# Patient Record
Sex: FEMALE | Race: WHITE | NOT HISPANIC OR LATINO | Employment: STUDENT | URBAN - METROPOLITAN AREA
[De-identification: names, ages, dates, MRNs, and addresses within clinical notes are randomized per-mention and may not be internally consistent; named-entity substitution may affect disease eponyms.]

---
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
## 2022-01-31 DIAGNOSIS — N94.6 DYSMENORRHEA IN ADOLESCENT: ICD-10-CM

## 2022-01-31 RX ORDER — LEVONORGESTREL AND ETHINYL ESTRADIOL 0.15-0.03
1 KIT ORAL DAILY
Qty: 84 TABLET | Refills: 3 | Status: SHIPPED | OUTPATIENT
Start: 2022-01-31 | End: 2022-07-21 | Stop reason: HOSPADM

## 2022-07-21 RX ORDER — FLUTICASONE PROPIONATE 50 MCG
SPRAY, SUSPENSION (ML) NASAL
COMMUNITY
Start: 2022-05-28 | End: 2022-12-28

## 2022-07-21 RX ORDER — LEVONORGESTREL AND ETHINYL ESTRADIOL 0.15-0.03
KIT ORAL
COMMUNITY
End: 2022-07-22 | Stop reason: SDUPTHER

## 2022-07-22 ENCOUNTER — OFFICE VISIT (OUTPATIENT)
Dept: FAMILY MEDICINE CLINIC | Facility: CLINIC | Age: 22
End: 2022-07-22
Payer: COMMERCIAL

## 2022-07-22 VITALS
BODY MASS INDEX: 21.86 KG/M2 | OXYGEN SATURATION: 100 % | TEMPERATURE: 97.4 F | HEIGHT: 66 IN | HEART RATE: 65 BPM | WEIGHT: 136 LBS | SYSTOLIC BLOOD PRESSURE: 100 MMHG | RESPIRATION RATE: 12 BRPM | DIASTOLIC BLOOD PRESSURE: 60 MMHG

## 2022-07-22 DIAGNOSIS — L70.0 ACNE VULGARIS: Primary | ICD-10-CM

## 2022-07-22 DIAGNOSIS — Z30.41 ENCOUNTER FOR SURVEILLANCE OF CONTRACEPTIVE PILLS: ICD-10-CM

## 2022-07-22 PROBLEM — Z20.828 SARS-ASSOCIATED CORONAVIRUS EXPOSURE: Status: RESOLVED | Noted: 2020-06-25 | Resolved: 2022-07-22

## 2022-07-22 PROBLEM — K21.9 GERD WITHOUT ESOPHAGITIS: Status: RESOLVED | Noted: 2017-12-13 | Resolved: 2022-07-22

## 2022-07-22 PROBLEM — B97.21 SARS-ASSOCIATED CORONAVIRUS INFECTION: Status: RESOLVED | Noted: 2020-08-20 | Resolved: 2022-07-22

## 2022-07-22 PROBLEM — Z13.0 SCREENING FOR SICKLE-CELL DISEASE OR TRAIT: Status: RESOLVED | Noted: 2020-06-25 | Resolved: 2022-07-22

## 2022-07-22 PROCEDURE — 99213 OFFICE O/P EST LOW 20 MIN: CPT | Performed by: NURSE PRACTITIONER

## 2022-07-22 RX ORDER — SPIRONOLACTONE 25 MG/1
25 TABLET ORAL DAILY
Qty: 90 TABLET | Refills: 3 | Status: SHIPPED | OUTPATIENT
Start: 2022-07-22

## 2022-07-22 RX ORDER — LEVONORGESTREL AND ETHINYL ESTRADIOL 0.15-0.03
1 KIT ORAL DAILY
Qty: 84 TABLET | Refills: 3 | Status: SHIPPED | OUTPATIENT
Start: 2022-07-22

## 2022-07-22 NOTE — PROGRESS NOTES
Assessment/Plan:    1  Acne vulgaris  -     spironolactone (ALDACTONE) 25 mg tablet; Take 1 tablet (25 mg total) by mouth daily    2  Encounter for surveillance of contraceptive pills  -     levonorgestrel-ethinyl estradiol (NORDETTE) 0 15-30 MG-MCG per tablet; Take 1 tablet by mouth daily             Return for Annual physical     Subjective:      Patient ID: Ramon Sanchez is a 23 y o  female  Chief Complaint   Patient presents with    Medication Management       Zina Talamantes is 27year old female who presents to the office for a medication check  No new acute complaints today  Doing well overall  The following portions of the patient's history were reviewed and updated as appropriate: allergies, current medications, past family history, past medical history, past social history, past surgical history and problem list     Review of Systems   Constitutional: Negative for chills, fatigue and fever  Respiratory: Negative for cough, chest tightness and shortness of breath  Cardiovascular: Negative for chest pain, palpitations and leg swelling  Current Outpatient Medications   Medication Sig Dispense Refill    albuterol (ProAir HFA) 90 mcg/act inhaler Inhale 2 puffs every 6 (six) hours as needed for wheezing 1 Inhaler 1    levonorgestrel-ethinyl estradiol (NORDETTE) 0 15-30 MG-MCG per tablet Take 1 tablet by mouth daily 84 tablet 3    spironolactone (ALDACTONE) 25 mg tablet Take 1 tablet (25 mg total) by mouth daily 90 tablet 3    fluticasone (FLONASE) 50 mcg/act nasal spray  (Patient not taking: No sig reported)       No current facility-administered medications for this visit  Objective:    /60   Pulse 65   Temp (!) 97 4 °F (36 3 °C) (Temporal)   Resp 12   Ht 5' 6" (1 676 m)   Wt 61 7 kg (136 lb)   SpO2 100%   BMI 21 95 kg/m²        Physical Exam  Vitals reviewed  Constitutional:       Appearance: Normal appearance  HENT:      Head: Normocephalic and atraumatic  Cardiovascular:      Rate and Rhythm: Normal rate and regular rhythm  Heart sounds: Normal heart sounds  Pulmonary:      Effort: Pulmonary effort is normal       Breath sounds: Normal breath sounds  Neurological:      Mental Status: She is alert and oriented to person, place, and time     Psychiatric:         Mood and Affect: Mood normal                 SCOTT Rios

## 2022-12-12 ENCOUNTER — OFFICE VISIT (OUTPATIENT)
Dept: FAMILY MEDICINE CLINIC | Facility: CLINIC | Age: 22
End: 2022-12-12

## 2022-12-12 VITALS
HEIGHT: 66 IN | DIASTOLIC BLOOD PRESSURE: 66 MMHG | OXYGEN SATURATION: 98 % | WEIGHT: 134 LBS | SYSTOLIC BLOOD PRESSURE: 98 MMHG | TEMPERATURE: 98.2 F | HEART RATE: 60 BPM | BODY MASS INDEX: 21.53 KG/M2 | RESPIRATION RATE: 12 BRPM

## 2022-12-12 DIAGNOSIS — J40 BRONCHITIS: Primary | ICD-10-CM

## 2022-12-12 DIAGNOSIS — J98.01 BRONCHOSPASM: ICD-10-CM

## 2022-12-12 DIAGNOSIS — R05.1 ACUTE COUGH: ICD-10-CM

## 2022-12-12 RX ORDER — BENZONATATE 200 MG/1
200 CAPSULE ORAL 3 TIMES DAILY PRN
Qty: 20 CAPSULE | Refills: 0 | Status: SHIPPED | OUTPATIENT
Start: 2022-12-12

## 2022-12-12 RX ORDER — ALBUTEROL SULFATE 2.5 MG/3ML
2.5 SOLUTION RESPIRATORY (INHALATION) EVERY 6 HOURS PRN
Qty: 30 ML | Refills: 0 | Status: SHIPPED | OUTPATIENT
Start: 2022-12-12

## 2022-12-12 RX ORDER — PREDNISONE 20 MG/1
TABLET ORAL
Qty: 11 TABLET | Refills: 0 | Status: SHIPPED | OUTPATIENT
Start: 2022-12-12

## 2022-12-12 NOTE — PROGRESS NOTES
Assessment/Plan:    1  Bronchitis  -     predniSONE 20 mg tablet; Take 2 tablets PO daily x's 3 days, then take 1 tablet daily x's 3 days, then take 1/2 tablet daily x's 3 days  -     benzonatate (TESSALON) 200 MG capsule; Take 1 capsule (200 mg total) by mouth 3 (three) times a day as needed for cough    2  Acute cough  -     predniSONE 20 mg tablet; Take 2 tablets PO daily x's 3 days, then take 1 tablet daily x's 3 days, then take 1/2 tablet daily x's 3 days  -     benzonatate (TESSALON) 200 MG capsule; Take 1 capsule (200 mg total) by mouth 3 (three) times a day as needed for cough    3  Bronchospasm  -     albuterol (2 5 mg/3 mL) 0 083 % nebulizer solution; Take 3 mL (2 5 mg total) by nebulization every 6 (six) hours as needed for wheezing or shortness of breath            There are no Patient Instructions on file for this visit  Return if symptoms worsen or fail to improve  Subjective:      Patient ID: Fahad Thompson is a 23 y o  female  Chief Complaint   Patient presents with   • Cold Like Symptoms     Pt c/o a cough and chest congestion for the past five days  Cough  This is a new problem  The current episode started in the past 7 days (5 days)  The problem has been waxing and waning  The problem occurs every few minutes  The cough is non-productive  Associated symptoms include nasal congestion, shortness of breath and wheezing  Pertinent negatives include no chest pain, chills, ear congestion, fever, headaches, postnasal drip or rhinorrhea  Nothing aggravates the symptoms  She has tried nothing for the symptoms  Her past medical history is significant for bronchitis  The following portions of the patient's history were reviewed and updated as appropriate: allergies, current medications, past family history, past medical history, past social history, past surgical history and problem list     Review of Systems   Constitutional: Negative for chills and fever  HENT: Positive for congestion  Negative for postnasal drip, rhinorrhea, sinus pressure and sinus pain  Respiratory: Positive for cough, chest tightness, shortness of breath and wheezing  Cardiovascular: Negative for chest pain  Neurological: Negative for headaches  Current Outpatient Medications   Medication Sig Dispense Refill   • albuterol (2 5 mg/3 mL) 0 083 % nebulizer solution Take 3 mL (2 5 mg total) by nebulization every 6 (six) hours as needed for wheezing or shortness of breath 30 mL 0   • albuterol (ProAir HFA) 90 mcg/act inhaler Inhale 2 puffs every 6 (six) hours as needed for wheezing 1 Inhaler 1   • benzonatate (TESSALON) 200 MG capsule Take 1 capsule (200 mg total) by mouth 3 (three) times a day as needed for cough 20 capsule 0   • levonorgestrel-ethinyl estradiol (NORDETTE) 0 15-30 MG-MCG per tablet Take 1 tablet by mouth daily 84 tablet 3   • predniSONE 20 mg tablet Take 2 tablets PO daily x's 3 days, then take 1 tablet daily x's 3 days, then take 1/2 tablet daily x's 3 days 11 tablet 0   • spironolactone (ALDACTONE) 25 mg tablet Take 1 tablet (25 mg total) by mouth daily 90 tablet 3   • fluticasone (FLONASE) 50 mcg/act nasal spray  (Patient not taking: Reported on 7/22/2022)       No current facility-administered medications for this visit  Objective:    BP 98/66 (BP Location: Right arm, Patient Position: Sitting, Cuff Size: Adult)   Pulse 60   Temp 98 2 °F (36 8 °C) (Temporal)   Resp 12   Ht 5' 6" (1 676 m)   Wt 60 8 kg (134 lb)   LMP 11/20/2022 (Approximate)   SpO2 98%   BMI 21 63 kg/m²        Physical Exam  Vitals reviewed  Constitutional:       General: She is not in acute distress  Appearance: Normal appearance  She is well-developed  She is not diaphoretic  HENT:      Head: Normocephalic and atraumatic  Right Ear: Tympanic membrane, ear canal and external ear normal  No drainage, swelling or tenderness  No middle ear effusion        Left Ear: Tympanic membrane, ear canal and external ear normal  No drainage, swelling or tenderness  No middle ear effusion  Nose: Rhinorrhea present  No mucosal edema  Rhinorrhea is clear  Right Sinus: No maxillary sinus tenderness or frontal sinus tenderness  Left Sinus: No maxillary sinus tenderness or frontal sinus tenderness  Mouth/Throat:      Pharynx: Uvula midline  No oropharyngeal exudate or posterior oropharyngeal erythema  Tonsils: 1+ on the right  1+ on the left  Eyes:      General:         Right eye: No discharge  Left eye: No discharge  Conjunctiva/sclera: Conjunctivae normal    Neck:      Thyroid: No thyromegaly  Cardiovascular:      Rate and Rhythm: Normal rate and regular rhythm  Heart sounds: Normal heart sounds  Pulmonary:      Effort: Pulmonary effort is normal       Breath sounds: Wheezing present  No decreased breath sounds, rhonchi or rales  Comments: Scant expiratory wheezing noted throughout BL lung fields  Musculoskeletal:      Cervical back: Normal range of motion and neck supple  Lymphadenopathy:      Cervical: No cervical adenopathy  Skin:     General: Skin is warm and dry  Findings: No rash  Neurological:      Mental Status: She is alert and oriented to person, place, and time  Psychiatric:         Behavior: Behavior normal          Thought Content:  Thought content normal                 SCOTT Villarreal

## 2022-12-28 ENCOUNTER — OFFICE VISIT (OUTPATIENT)
Dept: FAMILY MEDICINE CLINIC | Facility: CLINIC | Age: 22
End: 2022-12-28

## 2022-12-28 VITALS
SYSTOLIC BLOOD PRESSURE: 118 MMHG | OXYGEN SATURATION: 99 % | TEMPERATURE: 98.3 F | BODY MASS INDEX: 21.69 KG/M2 | WEIGHT: 135 LBS | RESPIRATION RATE: 12 BRPM | DIASTOLIC BLOOD PRESSURE: 74 MMHG | HEIGHT: 66 IN | HEART RATE: 76 BPM

## 2022-12-28 DIAGNOSIS — Z30.41 ENCOUNTER FOR SURVEILLANCE OF CONTRACEPTIVE PILLS: ICD-10-CM

## 2022-12-28 DIAGNOSIS — J02.9 PHARYNGITIS, UNSPECIFIED ETIOLOGY: Primary | ICD-10-CM

## 2022-12-28 LAB — S PYO AG THROAT QL: NEGATIVE

## 2022-12-28 RX ORDER — AZITHROMYCIN 250 MG/1
TABLET, FILM COATED ORAL
Qty: 6 TABLET | Refills: 0 | Status: SHIPPED | OUTPATIENT
Start: 2022-12-28 | End: 2023-01-01

## 2022-12-28 RX ORDER — LEVONORGESTREL AND ETHINYL ESTRADIOL 0.15-0.03
1 KIT ORAL DAILY
Qty: 84 TABLET | Refills: 3 | Status: SHIPPED | OUTPATIENT
Start: 2022-12-28

## 2022-12-28 RX ORDER — PREDNISONE 20 MG/1
40 TABLET ORAL DAILY
Qty: 8 TABLET | Refills: 0 | Status: SHIPPED | OUTPATIENT
Start: 2022-12-28 | End: 2023-01-01

## 2022-12-28 NOTE — PROGRESS NOTES
Name: Cynthia Linda      : 2000      MRN: [id number]  Encounter Provider: Janet Rodriguez MD  Encounter Date: 2022   Encounter department: [address] [place] [address]  Pharyngitis, unspecified etiology  -     azithromycin (ZITHROMAX) 250 mg tablet; Take 2 tablets today then 1 tablet daily x 4 days WITH FOOD  -     predniSONE 20 mg tablet; Take 2 tablets (40 mg total) by mouth daily for 4 days  -     POCT rapid strepA    2  Encounter for surveillance of contraceptive pills  -     levonorgestrel-ethinyl estradiol (NORDETTE) 0 15-30 MG-MCG per tablet; Take 1 tablet by mouth daily         Subjective      Sore Throat   This is a new problem  The problem has been gradually worsening  There has been no fever  The pain is mild  Associated symptoms include congestion, ear pain, headaches, swollen glands and trouble swallowing  Pertinent negatives include no abdominal pain, coughing, diarrhea, drooling, ear discharge, hoarse voice, plugged ear sensation, neck pain, shortness of breath, stridor or vomiting  She has had no exposure to strep or mono  She has tried NSAIDs for the symptoms  The treatment provided mild relief  Review of Systems   Constitutional: Negative for fever  HENT: Positive for congestion, ear pain, sore throat and trouble swallowing  Negative for drooling, ear discharge and hoarse voice  Eyes: Negative for discharge  Respiratory: Negative for cough, chest tightness, shortness of breath and stridor  Cardiovascular: Negative for chest pain and palpitations  Gastrointestinal: Negative for abdominal pain, diarrhea, nausea and vomiting  Musculoskeletal: Negative for arthralgias, joint swelling and neck pain  Neurological: Positive for headaches  Negative for numbness  Psychiatric/Behavioral: The patient is not nervous/anxious          Current Outpatient Medications on File Prior to Visit   Medication Sig   • albuterol (2 5 mg/3 mL) 0 083 % nebulizer solution Take 3 mL (2 5 mg total) by nebulization every 6 (six) hours as needed for wheezing or shortness of breath   • albuterol (ProAir HFA) 90 mcg/act inhaler Inhale 2 puffs every 6 (six) hours as needed for wheezing   • benzonatate (TESSALON) 200 MG capsule Take 1 capsule (200 mg total) by mouth 3 (three) times a day as needed for cough   • spironolactone (ALDACTONE) 25 mg tablet Take 1 tablet (25 mg total) by mouth daily   • [DISCONTINUED] levonorgestrel-ethinyl estradiol (NORDETTE) 0 15-30 MG-MCG per tablet Take 1 tablet by mouth daily   • [DISCONTINUED] fluticasone (FLONASE) 50 mcg/act nasal spray  (Patient not taking: Reported on 7/22/2022)   • [DISCONTINUED] predniSONE 20 mg tablet Take 2 tablets PO daily x's 3 days, then take 1 tablet daily x's 3 days, then take 1/2 tablet daily x's 3 days (Patient not taking: Reported on 12/28/2022)       Objective     /74 (BP Location: Right arm, Patient Position: Sitting, Cuff Size: Large)   Pulse 76   Temp 98 3 °F (36 8 °C) (Temporal)   Resp 12   Ht 5' 6" (1 676 m)   Wt 61 2 kg (135 lb)   LMP 12/25/2022   SpO2 99%   BMI 21 79 kg/m²     Physical Exam  Constitutional:       Appearance: She is well-developed  HENT:      Head: Normocephalic and atraumatic  Right Ear: Ear canal and external ear normal  A middle ear effusion is present  Left Ear: Ear canal and external ear normal  A middle ear effusion is present  Nose: Mucosal edema and rhinorrhea present  Mouth/Throat:      Pharynx: Uvula midline  Posterior oropharyngeal erythema present  No oropharyngeal exudate  Eyes:      General:         Right eye: No discharge  Left eye: No discharge  Pupils: Pupils are equal, round, and reactive to light  Cardiovascular:      Rate and Rhythm: Normal rate and regular rhythm  Heart sounds: Normal heart sounds  No murmur heard  Pulmonary:      Effort: Pulmonary effort is normal  No respiratory distress        Breath sounds: No wheezing or rales  Comments: PROLONGED EXP PHASE    Abdominal:      General: Bowel sounds are normal       Palpations: Abdomen is soft  Tenderness: There is no abdominal tenderness  There is no rebound  Musculoskeletal:      Cervical back: Normal range of motion and neck supple  Lymphadenopathy:      Cervical: No cervical adenopathy  Skin:     General: Skin is warm and dry  Findings: No rash  Neurological:      Mental Status: She is alert and oriented to person, place, and time  Psychiatric:         Behavior: Behavior normal          Thought Content:  Thought content normal          Judgment: Judgment normal        Cristiana Weber MD

## 2023-01-19 ENCOUNTER — OFFICE VISIT (OUTPATIENT)
Dept: FAMILY MEDICINE CLINIC | Facility: CLINIC | Age: 23
End: 2023-01-19

## 2023-01-19 VITALS
DIASTOLIC BLOOD PRESSURE: 70 MMHG | HEART RATE: 68 BPM | HEIGHT: 66 IN | SYSTOLIC BLOOD PRESSURE: 108 MMHG | RESPIRATION RATE: 12 BRPM | OXYGEN SATURATION: 98 % | WEIGHT: 136 LBS | BODY MASS INDEX: 21.86 KG/M2 | TEMPERATURE: 98.6 F

## 2023-01-19 DIAGNOSIS — K62.5 BRBPR (BRIGHT RED BLOOD PER RECTUM): Primary | ICD-10-CM

## 2023-01-19 DIAGNOSIS — K64.4 EXTERNAL HEMORRHOID: ICD-10-CM

## 2023-01-19 DIAGNOSIS — L70.0 ACNE VULGARIS: ICD-10-CM

## 2023-01-19 RX ORDER — SPIRONOLACTONE 25 MG/1
25 TABLET ORAL DAILY
Qty: 90 TABLET | Refills: 3 | Status: SHIPPED | OUTPATIENT
Start: 2023-01-19

## 2023-01-19 RX ORDER — HYDROCORTISONE 25 MG/G
CREAM TOPICAL 2 TIMES DAILY
Qty: 28 G | Refills: 0 | Status: SHIPPED | OUTPATIENT
Start: 2023-01-19

## 2023-01-19 NOTE — PROGRESS NOTES
Assessment/Plan:    1  BRBPR (bright red blood per rectum)  Assessment & Plan:  RTO if no relief in symptoms  Encourage high fiber diet, increase PO hydration  Metamucil in the AM  GI consult if no resolution in 10-14 days  2  External hemorrhoid  -     hydrocortisone (ANUSOL-HC) 2 5 % rectal cream; Apply topically 2 (two) times a day    3  Acne vulgaris  -     spironolactone (ALDACTONE) 25 mg tablet; Take 1 tablet (25 mg total) by mouth daily          Patient Instructions   Metamucil in the mornings with a full glass of water           Return in about 4 weeks (around 2/16/2023), or if symptoms worsen or fail to improve  Subjective:      Patient ID: Tono Rodriguez is a 23 y o  female  Chief Complaint   Patient presents with   • Hemorrhoids     Pt c/o hemorrhoids for the past ten days  April Avila is a 23year old female who presents to the office for evaluation and management of BRBPR  Reports that her symptoms occur with wiping after bowel movements  States she feels as if she does have a hemorrhoid  Denies significant constipation  The following portions of the patient's history were reviewed and updated as appropriate: allergies, current medications, past family history, past medical history, past social history, past surgical history and problem list     Review of Systems   Constitutional: Negative for chills, fatigue and fever  Respiratory: Negative for cough, chest tightness and shortness of breath  Cardiovascular: Negative for chest pain  Gastrointestinal: Positive for anal bleeding  Negative for abdominal distention, abdominal pain, blood in stool, constipation and diarrhea     Skin:        acne         Current Outpatient Medications   Medication Sig Dispense Refill   • albuterol (2 5 mg/3 mL) 0 083 % nebulizer solution Take 3 mL (2 5 mg total) by nebulization every 6 (six) hours as needed for wheezing or shortness of breath 30 mL 0   • albuterol (ProAir HFA) 90 mcg/act inhaler Inhale 2 puffs every 6 (six) hours as needed for wheezing 1 Inhaler 1   • hydrocortisone (ANUSOL-HC) 2 5 % rectal cream Apply topically 2 (two) times a day 28 g 0   • levonorgestrel-ethinyl estradiol (NORDETTE) 0 15-30 MG-MCG per tablet Take 1 tablet by mouth daily 84 tablet 3   • spironolactone (ALDACTONE) 25 mg tablet Take 1 tablet (25 mg total) by mouth daily 90 tablet 3     No current facility-administered medications for this visit  Objective:    /70 (BP Location: Right arm, Patient Position: Sitting, Cuff Size: Adult)   Pulse 68   Temp 98 6 °F (37 °C) (Temporal)   Resp 12   Ht 5' 6" (1 676 m)   Wt 61 7 kg (136 lb)   LMP 12/25/2022 (Exact Date)   SpO2 98%   BMI 21 95 kg/m²        Physical Exam  Vitals reviewed  Constitutional:       General: She is not in acute distress  Appearance: She is well-developed  She is not diaphoretic  HENT:      Head: Normocephalic and atraumatic  Eyes:      General:         Right eye: No discharge  Left eye: No discharge  Conjunctiva/sclera: Conjunctivae normal    Cardiovascular:      Rate and Rhythm: Normal rate and regular rhythm  Heart sounds: Normal heart sounds  Pulmonary:      Effort: Pulmonary effort is normal  No respiratory distress  Breath sounds: Normal breath sounds  No decreased breath sounds, wheezing, rhonchi or rales  Genitourinary:     Vagina: Normal       Rectum: External hemorrhoid present  No tenderness  Skin:     General: Skin is warm and dry  Findings: No rash  Neurological:      Mental Status: She is alert and oriented to person, place, and time  Psychiatric:         Behavior: Behavior normal          Thought Content:  Thought content normal          Judgment: Judgment normal               SCOTT Martin

## 2023-01-23 PROBLEM — K62.5 BRBPR (BRIGHT RED BLOOD PER RECTUM): Status: ACTIVE | Noted: 2023-01-23

## 2023-01-23 NOTE — ASSESSMENT & PLAN NOTE
RTO if no relief in symptoms  Encourage high fiber diet, increase PO hydration  Metamucil in the AM  GI consult if no resolution in 10-14 days

## 2023-06-07 DIAGNOSIS — L70.0 ACNE VULGARIS: ICD-10-CM

## 2023-06-07 RX ORDER — SPIRONOLACTONE 25 MG/1
25 TABLET ORAL DAILY
Qty: 90 TABLET | Refills: 0 | Status: SHIPPED | OUTPATIENT
Start: 2023-06-07

## 2023-06-07 NOTE — TELEPHONE ENCOUNTER
Requested medication(s) are due for refill today: Yes  Patient has already received a courtesy refill: No  Other reason request has been forwarded to provider: Pt is due for a CMP for this refill, is there any other labs you want to order?

## 2023-08-24 DIAGNOSIS — Z30.41 ENCOUNTER FOR SURVEILLANCE OF CONTRACEPTIVE PILLS: ICD-10-CM

## 2023-08-24 DIAGNOSIS — L70.0 ACNE VULGARIS: ICD-10-CM

## 2023-08-24 DIAGNOSIS — Z13.6 SCREENING FOR HYPERTENSION: Primary | ICD-10-CM

## 2023-08-24 RX ORDER — LEVONORGESTREL AND ETHINYL ESTRADIOL 0.15-0.03
1 KIT ORAL DAILY
Qty: 84 TABLET | Refills: 3 | Status: SHIPPED | OUTPATIENT
Start: 2023-08-24

## 2023-08-24 NOTE — TELEPHONE ENCOUNTER
Requested medication(s) are due for refill today: No unknown  Patient has already received a courtesy refill: No  Other reason request has been forwarded to provider:  The requested medication is not on the active medication list.

## 2023-08-28 RX ORDER — SPIRONOLACTONE 25 MG/1
25 TABLET ORAL DAILY
Qty: 90 TABLET | Refills: 0 | Status: SHIPPED | OUTPATIENT
Start: 2023-08-28

## 2023-08-28 NOTE — TELEPHONE ENCOUNTER
Requested medication(s) are due for refill today: Yes  Patient has already received a courtesy refill: No  Other reason request has been forwarded to provider: Pt due for a BP ck, appt made for when she is back from school around Thanksgiving. Labs also due, ordered and pt will get them done soon.

## 2023-11-24 ENCOUNTER — OFFICE VISIT (OUTPATIENT)
Dept: FAMILY MEDICINE CLINIC | Facility: CLINIC | Age: 23
End: 2023-11-24
Payer: COMMERCIAL

## 2023-11-24 VITALS
WEIGHT: 128 LBS | DIASTOLIC BLOOD PRESSURE: 60 MMHG | HEART RATE: 54 BPM | HEIGHT: 66 IN | OXYGEN SATURATION: 100 % | TEMPERATURE: 96.9 F | SYSTOLIC BLOOD PRESSURE: 94 MMHG | BODY MASS INDEX: 20.57 KG/M2 | RESPIRATION RATE: 12 BRPM

## 2023-11-24 DIAGNOSIS — L70.0 ACNE VULGARIS: ICD-10-CM

## 2023-11-24 DIAGNOSIS — K90.49 FOOD INTOLERANCE: ICD-10-CM

## 2023-11-24 DIAGNOSIS — Z13.29 SCREENING FOR THYROID DISORDER: ICD-10-CM

## 2023-11-24 DIAGNOSIS — Z13.0 SCREENING FOR DEFICIENCY ANEMIA: ICD-10-CM

## 2023-11-24 DIAGNOSIS — Z11.59 NEED FOR HEPATITIS C SCREENING TEST: ICD-10-CM

## 2023-11-24 DIAGNOSIS — Z13.1 SCREENING FOR DIABETES MELLITUS: ICD-10-CM

## 2023-11-24 DIAGNOSIS — Z11.3 SCREENING FOR STDS (SEXUALLY TRANSMITTED DISEASES): ICD-10-CM

## 2023-11-24 DIAGNOSIS — Z13.220 SCREENING FOR LIPID DISORDERS: ICD-10-CM

## 2023-11-24 DIAGNOSIS — R14.3 EXCESSIVE GAS: Primary | ICD-10-CM

## 2023-11-24 PROBLEM — K62.5 BRBPR (BRIGHT RED BLOOD PER RECTUM): Status: RESOLVED | Noted: 2023-01-23 | Resolved: 2023-11-24

## 2023-11-24 PROBLEM — N94.6 DYSMENORRHEA IN ADOLESCENT: Status: RESOLVED | Noted: 2020-06-25 | Resolved: 2023-11-24

## 2023-11-24 PROCEDURE — 99214 OFFICE O/P EST MOD 30 MIN: CPT | Performed by: NURSE PRACTITIONER

## 2023-11-24 RX ORDER — SPIRONOLACTONE 25 MG/1
25 TABLET ORAL DAILY
Qty: 90 TABLET | Refills: 1 | Status: SHIPPED | OUTPATIENT
Start: 2023-11-24

## 2023-11-24 NOTE — PROGRESS NOTES
Assessment/Plan:    1. Excessive gas  -     Food Allergy Profile; Future  -     Sedimentation rate, automated; Future  -     Food Allergy Profile  -     Sedimentation rate, automated    2. Food intolerance  -     Food Allergy Profile; Future  -     Sedimentation rate, automated; Future  -     Food Allergy Profile  -     Sedimentation rate, automated    3. Acne vulgaris  -     spironolactone (ALDACTONE) 25 mg tablet; Take 1 tablet (25 mg total) by mouth daily    4. Screening for deficiency anemia  -     CBC and differential; Future  -     CBC and differential    5. Screening for diabetes mellitus  -     Comprehensive metabolic panel; Future  -     Comprehensive metabolic panel    6. Screening for thyroid disorder  -     TSH, 3rd generation; Future  -     TSH, 3rd generation    7. Screening for lipid disorders  -     Lipid panel; Future  -     Lipid panel    8. Screening for STDs (sexually transmitted diseases)  -     Chlamydia/GC amplified DNA by PCR; Future; Expected date: 11/24/2023    9. Need for hepatitis C screening test  -     Hepatitis C Ab W/Refl To HCV RNA, Qn, PCR ([facility]); Future  -     Hepatitis C Ab W/Refl To HCV RNA, Qn, PCR ([facility])        Depression Screening and Follow-up Plan: Patient was screened for depression during today's encounter. They screened negative with a PHQ-2 score of 0. Patient Instructions   Pepcid in the mornings   Take a probiotic   Keep food diary  Complete lab work for food allergy        Return in about 4 weeks (around 12/22/2023) for Recheck. Subjective:      Patient ID: Pilar Sharma is a 26 y.o. female. Chief Complaint   Patient presents with    Blood Pressure Check       Socorro Lei is a 26year old female who presents to the office for a med check. Pt reports she has been taking spironolactone for acne and reports that thsi has been effective for management. Additionally, she reports that she has been having stomach upset.  Reports malodorous gas with eating certain foods such as dairy and gluten. Reports abdominal bloating. Reports that she has intermittent constipation mixed with normal stools. Denies nausea or vomiting. The following portions of the patient's history were reviewed and updated as appropriate: allergies, current medications, past family history, past medical history, past social history, past surgical history and problem list.    Review of Systems   Constitutional:  Negative for chills and fever. Gastrointestinal:  Positive for abdominal distention (chronic, intermittent) and constipation (chronic, intermittent). Negative for abdominal pain, diarrhea, nausea and vomiting. Current Outpatient Medications   Medication Sig Dispense Refill    albuterol (2.5 mg/3 mL) 0.083 % nebulizer solution Take 3 mL (2.5 mg total) by nebulization every 6 (six) hours as needed for wheezing or shortness of breath 30 mL 0    Kurvelo 0.15-30 MG-MCG per tablet TAKE 1 TABLET BY MOUTH EVERY DAY 84 tablet 3    spironolactone (ALDACTONE) 25 mg tablet Take 1 tablet (25 mg total) by mouth daily 90 tablet 1     No current facility-administered medications for this visit. Objective:    BP 94/60 (BP Location: Left arm, Patient Position: Sitting, Cuff Size: Standard)   Pulse (!) 54   Temp (!) 96.9 °F (36.1 °C) (Temporal)   Resp 12   Ht 5' 6" (1.676 m)   Wt 58.1 kg (128 lb)   LMP 11/12/2023   SpO2 100%   BMI 20.66 kg/m²        Physical Exam  Vitals reviewed. Constitutional:       Appearance: Normal appearance. HENT:      Head: Normocephalic and atraumatic. Cardiovascular:      Rate and Rhythm: Normal rate and regular rhythm. Heart sounds: Normal heart sounds. Pulmonary:      Effort: Pulmonary effort is normal.      Breath sounds: Normal breath sounds. Neurological:      Mental Status: She is alert and oriented to person, place, and time.    Psychiatric:         Mood and Affect: Mood normal.                SCOTT Velasquez

## 2023-11-28 LAB — HCV AB SERPL QL IA: NORMAL

## 2023-11-29 LAB
ALBUMIN SERPL-MCNC: 4.7 G/DL (ref 3.6–5.1)
ALBUMIN/GLOB SERPL: 1.6 (CALC) (ref 1–2.5)
ALMOND IGE QN: <0.1 KU/L
ALP SERPL-CCNC: 51 U/L (ref 31–125)
ALT SERPL-CCNC: 9 U/L (ref 6–29)
AST SERPL-CCNC: 28 U/L (ref 10–30)
BASOPHILS # BLD AUTO: 50 CELLS/UL (ref 0–200)
BASOPHILS NFR BLD AUTO: 0.9 %
BILIRUB SERPL-MCNC: 0.7 MG/DL (ref 0.2–1.2)
BUN SERPL-MCNC: 13 MG/DL (ref 7–25)
BUN/CREAT SERPL: 12 (CALC) (ref 6–22)
CALCIUM SERPL-MCNC: 9.8 MG/DL (ref 8.6–10.2)
CASHEW NUT IGE QN: <0.1 KU/L
CHLORIDE SERPL-SCNC: 100 MMOL/L (ref 98–110)
CHOLEST SERPL-MCNC: 242 MG/DL
CHOLEST/HDLC SERPL: 1.8 (CALC)
CO2 SERPL-SCNC: 26 MMOL/L (ref 20–32)
CODFISH IGE QN: <0.1 KU/L
CREAT SERPL-MCNC: 1.06 MG/DL (ref 0.5–0.96)
DEPRECATED ALMOND IGE RAST QL: 0
DEPRECATED CASHEW NUT IGE RAST QL: 0
DEPRECATED CODFISH IGE RAST QL: 0
DEPRECATED EGG WHITE IGE RAST QL: 0
DEPRECATED HAZELNUT IGE RAST QL: 0
DEPRECATED MILK IGE RAST QL: ABNORMAL
DEPRECATED PEANUT IGE RAST QL: 0
DEPRECATED SALMON IGE RAST QL: 0
DEPRECATED SCALLOP IGE RAST QL: 0
DEPRECATED SESAME SEED IGE RAST QL: 0
DEPRECATED SHRIMP IGE RAST QL: 0
DEPRECATED SOYBEAN IGE RAST QL: 0
DEPRECATED TUNA IGE RAST QL: 0
DEPRECATED WALNUT IGE RAST QL: 0
DEPRECATED WHEAT IGE RAST QL: 0
EGG WHITE IGE QN: <0.1 KU/L
EOSINOPHIL # BLD AUTO: 50 CELLS/UL (ref 15–500)
EOSINOPHIL NFR BLD AUTO: 0.9 %
ERYTHROCYTE [DISTWIDTH] IN BLOOD BY AUTOMATED COUNT: 12.1 % (ref 11–15)
ERYTHROCYTE [SEDIMENTATION RATE] IN BLOOD BY WESTERGREN METHOD: 6 MM/H
GFR/BSA.PRED SERPLBLD CYS-BASED-ARV: 77 ML/MIN/1.73M2
GLOBULIN SER CALC-MCNC: 2.9 G/DL (CALC) (ref 1.9–3.7)
GLUCOSE SERPL-MCNC: 84 MG/DL (ref 65–99)
HAZELNUT IGE QN: <0.1 KU/L
HCT VFR BLD AUTO: 40.2 % (ref 35–45)
HDLC SERPL-MCNC: 135 MG/DL
HGB BLD-MCNC: 13.2 G/DL (ref 11.7–15.5)
LDLC SERPL CALC-MCNC: 92 MG/DL (CALC)
LYMPHOCYTES # BLD AUTO: 2360 CELLS/UL (ref 850–3900)
LYMPHOCYTES NFR BLD AUTO: 42.9 %
MCH RBC QN AUTO: 30.4 PG (ref 27–33)
MCHC RBC AUTO-ENTMCNC: 32.8 G/DL (ref 32–36)
MCV RBC AUTO: 92.6 FL (ref 80–100)
MILK IGE QN: 0.16 KU/L
MONOCYTES # BLD AUTO: 281 CELLS/UL (ref 200–950)
MONOCYTES NFR BLD AUTO: 5.1 %
NEUTROPHILS # BLD AUTO: 2761 CELLS/UL (ref 1500–7800)
NEUTROPHILS NFR BLD AUTO: 50.2 %
NONHDLC SERPL-MCNC: 107 MG/DL (CALC)
PEANUT IGE QN: <0.1 KU/L
PLATELET # BLD AUTO: 327 THOUSAND/UL (ref 140–400)
PMV BLD REES-ECKER: 11.2 FL (ref 7.5–12.5)
POTASSIUM SERPL-SCNC: 4.3 MMOL/L (ref 3.5–5.3)
PROT SERPL-MCNC: 7.6 G/DL (ref 6.1–8.1)
RBC # BLD AUTO: 4.34 MILLION/UL (ref 3.8–5.1)
SALMON IGE QN: <0.1 KU/L
SCALLOP IGE QN: <0.1 KU/L
SESAME SEED IGE QN: <0.1 KU/L
SHRIMP IGE QN: <0.1 KU/L
SODIUM SERPL-SCNC: 137 MMOL/L (ref 135–146)
SOYBEAN IGE QN: <0.1 KU/L
TRIGL SERPL-MCNC: 65 MG/DL
TSH SERPL-ACNC: 1.21 MIU/L
TUNA IGE QN: <0.1 KU/L
WALNUT IGE QN: <0.1 KU/L
WBC # BLD AUTO: 5.5 THOUSAND/UL (ref 3.8–10.8)
WHEAT IGE QN: <0.1 KU/L

## 2023-12-22 ENCOUNTER — OFFICE VISIT (OUTPATIENT)
Dept: FAMILY MEDICINE CLINIC | Facility: CLINIC | Age: 23
End: 2023-12-22
Payer: COMMERCIAL

## 2023-12-22 VITALS
BODY MASS INDEX: 20.18 KG/M2 | TEMPERATURE: 97.1 F | DIASTOLIC BLOOD PRESSURE: 60 MMHG | SYSTOLIC BLOOD PRESSURE: 98 MMHG | HEART RATE: 64 BPM | OXYGEN SATURATION: 98 % | WEIGHT: 125 LBS

## 2023-12-22 DIAGNOSIS — R14.3 EXCESSIVE GAS: ICD-10-CM

## 2023-12-22 DIAGNOSIS — L70.0 ACNE VULGARIS: ICD-10-CM

## 2023-12-22 DIAGNOSIS — Z23 NEED FOR TDAP VACCINATION: ICD-10-CM

## 2023-12-22 DIAGNOSIS — Z91.011 COW'S MILK ALLERGY: Primary | ICD-10-CM

## 2023-12-22 DIAGNOSIS — K90.49 FOOD INTOLERANCE: ICD-10-CM

## 2023-12-22 PROCEDURE — 90471 IMMUNIZATION ADMIN: CPT

## 2023-12-22 PROCEDURE — 90715 TDAP VACCINE 7 YRS/> IM: CPT

## 2023-12-22 PROCEDURE — 99213 OFFICE O/P EST LOW 20 MIN: CPT | Performed by: NURSE PRACTITIONER

## 2023-12-22 NOTE — ASSESSMENT & PLAN NOTE
Pt is taking aldactone and is interested in stopping this medication. Advise that she can DC this medication and monitor for symptoms.

## 2023-12-22 NOTE — PROGRESS NOTES
Assessment/Plan:    1. Cow's milk allergy  Assessment & Plan:  Advise pt to avoid cow milk products.      2. Excessive gas    3. Food intolerance  Assessment & Plan:  If symptoms persist or worsen follow up with GI.       4. Acne vulgaris  Assessment & Plan:  Pt is taking aldactone and is interested in stopping this medication. Advise that she can DC this medication and monitor for symptoms.       5. Need for Tdap vaccination  -     TDAP VACCINE GREATER THAN OR EQUAL TO 8YO IM              Return for Annual physical.    Subjective:      Patient ID: Antonietta Manzano is a 23 y.o. female.    Chief Complaint   Patient presents with    Follow-up     Review labs       Antonietta is a 23 year old female who presents to the office for a recheck and discussion of labs. Pt reports that she had been having excessive gas and abdominal blaoting associated with meals. Reports that she did see her resuts on Mychart and has avoided dairy products.         The following portions of the patient's history were reviewed and updated as appropriate: allergies, current medications, past family history, past medical history, past social history, past surgical history and problem list.    Review of Systems   Constitutional:  Negative for chills, fatigue and fever.   Respiratory:  Negative for cough, chest tightness and shortness of breath.    Cardiovascular:  Negative for chest pain.         Current Outpatient Medications   Medication Sig Dispense Refill    albuterol (2.5 mg/3 mL) 0.083 % nebulizer solution Take 3 mL (2.5 mg total) by nebulization every 6 (six) hours as needed for wheezing or shortness of breath 30 mL 0    Kurvelo 0.15-30 MG-MCG per tablet TAKE 1 TABLET BY MOUTH EVERY DAY 84 tablet 3    spironolactone (ALDACTONE) 25 mg tablet Take 1 tablet (25 mg total) by mouth daily 90 tablet 1     No current facility-administered medications for this visit.       Objective:    BP 98/60 (BP Location: Left arm, Patient Position: Sitting, Cuff  Size: Large)   Pulse 64   Temp (!) 97.1 °F (36.2 °C) (Temporal)   Wt 56.7 kg (125 lb)   LMP 11/12/2023   SpO2 98%   BMI 20.18 kg/m²        Physical Exam  Vitals reviewed.   Constitutional:       Appearance: Normal appearance.   HENT:      Head: Normocephalic and atraumatic.   Cardiovascular:      Rate and Rhythm: Normal rate and regular rhythm.      Heart sounds: Normal heart sounds.   Pulmonary:      Effort: Pulmonary effort is normal.      Breath sounds: Normal breath sounds.   Neurological:      Mental Status: She is alert and oriented to person, place, and time.   Psychiatric:         Mood and Affect: Mood normal.                SCOTT Reed

## 2024-01-01 DIAGNOSIS — L70.0 ACNE VULGARIS: ICD-10-CM

## 2024-01-02 RX ORDER — SPIRONOLACTONE 25 MG/1
25 TABLET ORAL DAILY
Qty: 90 TABLET | Refills: 1 | Status: SHIPPED | OUTPATIENT
Start: 2024-01-02

## 2024-02-09 DIAGNOSIS — Z30.41 ENCOUNTER FOR SURVEILLANCE OF CONTRACEPTIVE PILLS: ICD-10-CM

## 2024-02-09 RX ORDER — LEVONORGESTREL AND ETHINYL ESTRADIOL 0.15-0.03
1 KIT ORAL DAILY
Qty: 84 TABLET | Refills: 1 | Status: SHIPPED | OUTPATIENT
Start: 2024-02-09

## 2024-04-23 DIAGNOSIS — L70.0 ACNE VULGARIS: ICD-10-CM

## 2024-04-23 RX ORDER — SPIRONOLACTONE 25 MG/1
25 TABLET ORAL DAILY
Qty: 90 TABLET | Refills: 1 | Status: SHIPPED | OUTPATIENT
Start: 2024-04-23

## 2024-04-23 NOTE — TELEPHONE ENCOUNTER
Reason for call: NOT A DUPLICATE patient is at [facility] and her medication went to the incorrect pharmacy. She is currently out of medication    [x] Refill   [] Prior Auth  [] Other:     Office:   [x] PCP/Provider - Dr Houser  [] Specialty/Provider -     Medication: spironolactone    Dose/Frequency: 25 mg daily    Quantity: 90D w refill    Pharmacy: [facility] on file     Does the patient have enough for 3 days?   [] Yes   [x] No - Send as HP to POD

## 2024-05-28 ENCOUNTER — OFFICE VISIT (OUTPATIENT)
Dept: FAMILY MEDICINE CLINIC | Facility: CLINIC | Age: 24
End: 2024-05-28
Payer: COMMERCIAL

## 2024-05-28 VITALS
TEMPERATURE: 97.7 F | WEIGHT: 120.2 LBS | HEIGHT: 66 IN | BODY MASS INDEX: 19.32 KG/M2 | HEART RATE: 78 BPM | SYSTOLIC BLOOD PRESSURE: 100 MMHG | OXYGEN SATURATION: 99 % | RESPIRATION RATE: 16 BRPM | DIASTOLIC BLOOD PRESSURE: 60 MMHG

## 2024-05-28 DIAGNOSIS — N92.6 MISSED PERIODS: ICD-10-CM

## 2024-05-28 DIAGNOSIS — J04.0 LARYNGITIS: Primary | ICD-10-CM

## 2024-05-28 DIAGNOSIS — Z30.41 ENCOUNTER FOR SURVEILLANCE OF CONTRACEPTIVE PILLS: ICD-10-CM

## 2024-05-28 LAB — SL AMB POCT URINE HCG: NEGATIVE

## 2024-05-28 PROCEDURE — 99213 OFFICE O/P EST LOW 20 MIN: CPT | Performed by: NURSE PRACTITIONER

## 2024-05-28 PROCEDURE — 81025 URINE PREGNANCY TEST: CPT | Performed by: NURSE PRACTITIONER

## 2024-05-28 RX ORDER — LEVONORGESTREL AND ETHINYL ESTRADIOL 0.15-0.03
1 KIT ORAL DAILY
Qty: 84 TABLET | Refills: 3 | Status: SHIPPED | OUTPATIENT
Start: 2024-05-28

## 2024-05-28 NOTE — ASSESSMENT & PLAN NOTE
Pt reports missed last 2 periods. Recent weight loss, increase exercise and decreased oral intake. Advise nutrition and hydration throughout the day. Restart contraception, monitor for return of cycles.

## 2024-05-28 NOTE — PROGRESS NOTES
Assessment/Plan:    1. Laryngitis  Comments:  Recommend supportive care with fluids, voice rest and humidification (hot showers, humidifier). RTO if no symptom improvement in 4-5 days.    2. Encounter for surveillance of contraceptive pills  Assessment & Plan:  Pt reports missed last 2 periods. Recent weight loss, increase exercise and decreased oral intake. Advise nutrition and hydration throughout the day. Restart contraception, monitor for return of cycles.   Orders:  -     levonorgestrel-ethinyl estradiol (Kurvelo) 0.15-30 MG-MCG per tablet; Take 1 tablet by mouth daily  -     POCT urine HCG    3. Missed periods  -     POCT urine HCG            Return if symptoms worsen or fail to improve.    Subjective:      Patient ID: Antonietta Manzano is a 24 y.o. female.    Chief Complaint   Patient presents with    Laryngitis    Nasal Congestion     Green x 6 days, William/RADHAN    post nasal drip       Cough  This is a new problem. The current episode started in the past 7 days. The problem has been unchanged. The problem occurs hourly. The cough is Productive of sputum. Associated symptoms include headaches, nasal congestion, postnasal drip, rhinorrhea and a sore throat. Pertinent negatives include no chest pain, chills, ear congestion, ear pain, fever, heartburn, hemoptysis, myalgias, rash, shortness of breath, sweats, weight loss or wheezing. Nothing aggravates the symptoms.       The following portions of the patient's history were reviewed and updated as appropriate: allergies, current medications, past family history, past medical history, past social history, past surgical history and problem list.    Review of Systems   Constitutional:  Negative for chills, fever and weight loss.   HENT:  Positive for postnasal drip, rhinorrhea, sore throat and voice change (hoarse voice, lost voice). Negative for ear pain.    Respiratory:  Positive for cough (now resolved). Negative for hemoptysis, shortness of breath and wheezing.   "  Cardiovascular:  Negative for chest pain.   Gastrointestinal:  Negative for heartburn.   Genitourinary:  Positive for menstrual problem (missed last 2 periods).   Musculoskeletal:  Negative for myalgias.   Skin:  Negative for rash.   Neurological:  Positive for headaches.         Current Outpatient Medications   Medication Sig Dispense Refill    levonorgestrel-ethinyl estradiol (Kurvelo) 0.15-30 MG-MCG per tablet Take 1 tablet by mouth daily 84 tablet 3    spironolactone (ALDACTONE) 25 mg tablet Take 1 tablet (25 mg total) by mouth daily 90 tablet 1    albuterol (2.5 mg/3 mL) 0.083 % nebulizer solution Take 3 mL (2.5 mg total) by nebulization every 6 (six) hours as needed for wheezing or shortness of breath (Patient not taking: Reported on 5/28/2024) 30 mL 0     No current facility-administered medications for this visit.       Objective:    /60   Pulse 78   Temp 97.7 °F (36.5 °C) (Temporal)   Resp 16   Ht 5' 6\" (1.676 m)   Wt 54.5 kg (120 lb 3.2 oz)   LMP 03/24/2024 (Approximate)   SpO2 99%   BMI 19.40 kg/m²        Physical Exam  Vitals reviewed.   Constitutional:       General: She is not in acute distress.     Appearance: Normal appearance. She is well-developed. She is not diaphoretic.   HENT:      Head: Normocephalic and atraumatic.      Right Ear: Tympanic membrane, ear canal and external ear normal. No drainage, swelling or tenderness. No middle ear effusion.      Left Ear: Tympanic membrane, ear canal and external ear normal. No drainage, swelling or tenderness.  No middle ear effusion.      Nose: Rhinorrhea present. No sinus tenderness or mucosal edema. Rhinorrhea is clear.      Right Sinus: No maxillary sinus tenderness or frontal sinus tenderness.      Left Sinus: No maxillary sinus tenderness or frontal sinus tenderness.      Mouth/Throat:      Mouth: Mucous membranes are normal.      Pharynx: Oropharynx is clear. Uvula midline. No oropharyngeal exudate, posterior oropharyngeal edema or " posterior oropharyngeal erythema.      Comments: Hoarse voice noted during interview  Eyes:      General:         Right eye: No discharge.         Left eye: No discharge.      Conjunctiva/sclera: Conjunctivae normal.   Neck:      Thyroid: No thyromegaly.   Cardiovascular:      Rate and Rhythm: Normal rate and regular rhythm.      Heart sounds: Normal heart sounds.   Pulmonary:      Effort: Pulmonary effort is normal.      Breath sounds: Normal breath sounds. No decreased breath sounds, wheezing, rhonchi or rales.   Musculoskeletal:      Cervical back: Full passive range of motion without pain, normal range of motion and neck supple.   Lymphadenopathy:      Cervical: No cervical adenopathy.   Skin:     General: Skin is warm and dry.      Findings: No rash.   Neurological:      Mental Status: She is alert and oriented to person, place, and time.   Psychiatric:         Mood and Affect: Mood and affect normal.         Behavior: Behavior normal.         Thought Content: Thought content normal.                SCOTT Reed

## 2024-06-17 DIAGNOSIS — L70.0 ACNE VULGARIS: ICD-10-CM

## 2024-06-18 RX ORDER — SPIRONOLACTONE 25 MG/1
25 TABLET ORAL DAILY
Qty: 90 TABLET | Refills: 1 | Status: SHIPPED | OUTPATIENT
Start: 2024-06-18

## 2024-08-01 ENCOUNTER — PATIENT MESSAGE (OUTPATIENT)
Dept: FAMILY MEDICINE CLINIC | Facility: CLINIC | Age: 24
End: 2024-08-01

## 2024-08-01 DIAGNOSIS — K90.49 FOOD INTOLERANCE: Primary | ICD-10-CM

## 2024-10-16 ENCOUNTER — PATIENT MESSAGE (OUTPATIENT)
Dept: FAMILY MEDICINE CLINIC | Facility: CLINIC | Age: 24
End: 2024-10-16

## 2024-10-16 DIAGNOSIS — Z13.1 SCREENING FOR DIABETES MELLITUS: ICD-10-CM

## 2024-10-16 DIAGNOSIS — Z13.220 SCREENING FOR LIPID DISORDERS: ICD-10-CM

## 2024-10-16 DIAGNOSIS — Z13.0 SCREENING FOR DEFICIENCY ANEMIA: Primary | ICD-10-CM

## 2024-10-16 DIAGNOSIS — Z13.29 SCREENING FOR THYROID DISORDER: ICD-10-CM

## 2024-10-16 NOTE — PATIENT COMMUNICATION
Spoke to Antonietta and offered an appt with Dr. Randolph.  Johnna already put in the labs for [facility]    She has new insurance.  She is going to check with her mom to see what lab they are suppose to use.

## 2024-10-16 NOTE — PATIENT COMMUNICATION
Patient cannot do 10/23 or 10/25-  she is asking to be called if anything opens up on 10/17, 10/18, 10/21 or 10/22.  Please reach ouf if any cancels

## 2024-10-20 LAB
ALBUMIN SERPL-MCNC: 4.6 G/DL (ref 3.6–5.1)
ALBUMIN/GLOB SERPL: 1.5 (CALC) (ref 1–2.5)
ALP SERPL-CCNC: 38 U/L (ref 31–125)
ALT SERPL-CCNC: 10 U/L (ref 6–29)
AST SERPL-CCNC: 22 U/L (ref 10–30)
BASOPHILS # BLD AUTO: 42 CELLS/UL (ref 0–200)
BASOPHILS NFR BLD AUTO: 1 %
BILIRUB SERPL-MCNC: 0.5 MG/DL (ref 0.2–1.2)
BUN SERPL-MCNC: 18 MG/DL (ref 7–25)
BUN/CREAT SERPL: 18 (CALC) (ref 6–22)
CALCIUM SERPL-MCNC: 9.6 MG/DL (ref 8.6–10.2)
CHLORIDE SERPL-SCNC: 102 MMOL/L (ref 98–110)
CHOLEST SERPL-MCNC: 251 MG/DL
CHOLEST/HDLC SERPL: 1.8 (CALC)
CO2 SERPL-SCNC: 29 MMOL/L (ref 20–32)
CREAT SERPL-MCNC: 1.02 MG/DL (ref 0.5–0.96)
EOSINOPHIL # BLD AUTO: 42 CELLS/UL (ref 15–500)
EOSINOPHIL NFR BLD AUTO: 1 %
ERYTHROCYTE [DISTWIDTH] IN BLOOD BY AUTOMATED COUNT: 12.1 % (ref 11–15)
GFR/BSA.PRED SERPLBLD CYS-BASED-ARV: 80 ML/MIN/1.73M2
GLOBULIN SER CALC-MCNC: 3.1 G/DL (CALC) (ref 1.9–3.7)
GLUCOSE SERPL-MCNC: 81 MG/DL (ref 65–99)
HCT VFR BLD AUTO: 39.2 % (ref 35–45)
HDLC SERPL-MCNC: 139 MG/DL
HGB BLD-MCNC: 12.7 G/DL (ref 11.7–15.5)
LDLC SERPL CALC-MCNC: 96 MG/DL (CALC)
LYMPHOCYTES # BLD AUTO: 1709 CELLS/UL (ref 850–3900)
LYMPHOCYTES NFR BLD AUTO: 40.7 %
MCH RBC QN AUTO: 30.9 PG (ref 27–33)
MCHC RBC AUTO-ENTMCNC: 32.4 G/DL (ref 32–36)
MCV RBC AUTO: 95.4 FL (ref 80–100)
MONOCYTES # BLD AUTO: 323 CELLS/UL (ref 200–950)
MONOCYTES NFR BLD AUTO: 7.7 %
NEUTROPHILS # BLD AUTO: 2083 CELLS/UL (ref 1500–7800)
NEUTROPHILS NFR BLD AUTO: 49.6 %
NONHDLC SERPL-MCNC: 112 MG/DL (CALC)
PLATELET # BLD AUTO: 342 THOUSAND/UL (ref 140–400)
PMV BLD REES-ECKER: 10.4 FL (ref 7.5–12.5)
POTASSIUM SERPL-SCNC: 4.5 MMOL/L (ref 3.5–5.3)
PROT SERPL-MCNC: 7.7 G/DL (ref 6.1–8.1)
RBC # BLD AUTO: 4.11 MILLION/UL (ref 3.8–5.1)
SODIUM SERPL-SCNC: 138 MMOL/L (ref 135–146)
TRIGL SERPL-MCNC: 73 MG/DL
TSH SERPL-ACNC: 1.15 MIU/L
WBC # BLD AUTO: 4.2 THOUSAND/UL (ref 3.8–10.8)

## 2024-10-28 ENCOUNTER — OFFICE VISIT (OUTPATIENT)
Dept: FAMILY MEDICINE CLINIC | Facility: CLINIC | Age: 24
End: 2024-10-28
Payer: COMMERCIAL

## 2024-10-28 VITALS
BODY MASS INDEX: 20.03 KG/M2 | OXYGEN SATURATION: 99 % | TEMPERATURE: 97.1 F | SYSTOLIC BLOOD PRESSURE: 104 MMHG | WEIGHT: 124.6 LBS | DIASTOLIC BLOOD PRESSURE: 70 MMHG | HEART RATE: 47 BPM | RESPIRATION RATE: 16 BRPM | HEIGHT: 66 IN

## 2024-10-28 DIAGNOSIS — Z12.4 CERVICAL CANCER SCREENING: ICD-10-CM

## 2024-10-28 DIAGNOSIS — Z00.00 ANNUAL PHYSICAL EXAM: Primary | ICD-10-CM

## 2024-10-28 DIAGNOSIS — L70.0 ACNE VULGARIS: ICD-10-CM

## 2024-10-28 DIAGNOSIS — Z12.4 SCREENING FOR CERVICAL CANCER: ICD-10-CM

## 2024-10-28 DIAGNOSIS — N92.6 IRREGULAR PERIODS/MENSTRUAL CYCLES: ICD-10-CM

## 2024-10-28 PROCEDURE — 99395 PREV VISIT EST AGE 18-39: CPT | Performed by: STUDENT IN AN ORGANIZED HEALTH CARE EDUCATION/TRAINING PROGRAM

## 2024-10-28 PROCEDURE — 99213 OFFICE O/P EST LOW 20 MIN: CPT | Performed by: STUDENT IN AN ORGANIZED HEALTH CARE EDUCATION/TRAINING PROGRAM

## 2024-10-28 NOTE — PROGRESS NOTES
Adult Annual Physical  Name: Antonietta Manzano      : 2000      MRN: [id number]  Encounter Provider: Karina Randolph MD  Encounter Date: 10/28/2024   Encounter department: [facility]    Assessment & Plan  Annual physical exam         Irregular periods/menstrual cycles    Orders:    US pelvis complete w transvaginal; Future    Acne vulgaris    Orders:    US pelvis complete w transvaginal; Future    Screening for cervical cancer         Cervical cancer screening    Orders:    IGP,rfx Aptima HPV all pth    Immunizations and preventive care screenings were discussed with patient today. Appropriate education was printed on patient's after visit summary.    Counseling:  Dental Health: discussed importance of regular tooth brushing, flossing, and dental visits.  Sexual health: discussed sexually transmitted diseases, partner selection, use of condoms, avoidance of unintended pregnancy, and contraceptive alternatives.  Exercise: the importance of regular exercise/physical activity was discussed. Recommend exercise 3-5 times per week for at least 30 minutes.       Depression Screening and Follow-up Plan: Patient was screened for depression during today's encounter. They screened negative with a PHQ-2 score of 0.        History of Present Illness     Adult Annual Physical:  Patient presents for annual physical. Patient has irregular periods and notes her acne is worsening. She notes she is more fatigued and not sleeping well at night. .     Diet and Physical Activity:  - Diet/Nutrition: consuming 3-5 servings of fruits/vegetables daily, adequate fiber intake, heart healthy (low sodium) diet and well balanced diet.  - Exercise: vigorous cardiovascular exercise, strength training exercises and 5-7 times a week on average.    Depression Screening:  - PHQ-2 Score: 0    General Health:    - Vision: no vision problems.    /GYN Health:    - Menopause: premenopausal.   - History of STDs: no  -  "Contraception: oral contraceptives.      Review of Systems   Constitutional:  Negative for activity change, chills, diaphoresis, fatigue and fever.   HENT:  Negative for congestion, postnasal drip, rhinorrhea and sore throat.    Respiratory:  Negative for cough, shortness of breath and wheezing.    Cardiovascular:  Negative for chest pain, palpitations and leg swelling.   Gastrointestinal:  Negative for abdominal pain, constipation, diarrhea, nausea and vomiting.   Musculoskeletal:  Negative for myalgias.   Skin:  Negative for rash.   Neurological:  Negative for weakness, light-headedness and headaches.   Psychiatric/Behavioral:  The patient is not nervous/anxious.          Objective     /70   Pulse (!) 47   Temp (!) 97.1 °F (36.2 °C) (Temporal)   Resp 16   Ht 5' 6\" (1.676 m)   Wt 56.5 kg (124 lb 9.6 oz)   LMP 09/23/2024   SpO2 99%   BMI 20.11 kg/m²     Physical Exam  Vitals and nursing note reviewed. Exam conducted with a chaperone present.   Constitutional:       General: She is not in acute distress.     Appearance: She is well-developed.   HENT:      Head: Normocephalic and atraumatic.   Eyes:      Conjunctiva/sclera: Conjunctivae normal.   Cardiovascular:      Rate and Rhythm: Normal rate and regular rhythm.      Heart sounds: No murmur heard.  Pulmonary:      Effort: Pulmonary effort is normal. No respiratory distress.      Breath sounds: Normal breath sounds.   Abdominal:      Palpations: Abdomen is soft.      Tenderness: There is no abdominal tenderness.   Genitourinary:     Vagina: Normal.      Cervix: Discharge and cervical bleeding present.   Musculoskeletal:         General: No swelling.      Cervical back: Neck supple.   Skin:     General: Skin is warm and dry.      Capillary Refill: Capillary refill takes less than 2 seconds.   Neurological:      Mental Status: She is alert.   Psychiatric:         Mood and Affect: Mood normal.         "

## 2024-11-04 LAB
CYTOLOGIST CVX/VAG CYTO: NORMAL
DX ICD CODE: NORMAL
OTHER STN SPEC: NORMAL
OTHER STN SPEC: NORMAL
PATH REPORT.FINAL DX SPEC: NORMAL
SL AMB NOTE:: NORMAL
SL AMB SPECIMEN ADEQUACY: NORMAL
SL AMB TEST METHODOLOGY: NORMAL

## 2024-12-10 ENCOUNTER — TELEPHONE (OUTPATIENT)
Age: 24
End: 2024-12-10

## 2024-12-10 NOTE — TELEPHONE ENCOUNTER
Informed Antonietta of Johnna's response.  Forwarded this message to Dr Randolph for her review.

## 2024-12-10 NOTE — TELEPHONE ENCOUNTER
This US was ordered by Dr. Randolph. Her US looks OK, no evidence to suggest ovarian or uterine cause for irregular menses as documented at her visit. But she should discuss her results further with Dr. Randolph and correlate with presenting symptoms.

## 2024-12-10 NOTE — TELEPHONE ENCOUNTER
Patient called in to discuss US pelvis results that was completed 11/21 to discuss next steps. External results on chart. Patient also wanted PCP to be aware that she has continued stomach/GI issues with constipation, bloating. Please follow up with patient for results and ongoing GI concerns.

## 2025-01-07 ENCOUNTER — OFFICE VISIT (OUTPATIENT)
Dept: FAMILY MEDICINE CLINIC | Facility: CLINIC | Age: 25
End: 2025-01-07
Payer: COMMERCIAL

## 2025-01-07 VITALS
WEIGHT: 126 LBS | HEART RATE: 58 BPM | RESPIRATION RATE: 16 BRPM | DIASTOLIC BLOOD PRESSURE: 70 MMHG | BODY MASS INDEX: 20.25 KG/M2 | SYSTOLIC BLOOD PRESSURE: 92 MMHG | HEIGHT: 66 IN | TEMPERATURE: 96.6 F | OXYGEN SATURATION: 98 %

## 2025-01-07 DIAGNOSIS — J06.9 UPPER RESPIRATORY TRACT INFECTION, UNSPECIFIED TYPE: Primary | ICD-10-CM

## 2025-01-07 DIAGNOSIS — R05.1 ACUTE COUGH: ICD-10-CM

## 2025-01-07 DIAGNOSIS — R09.81 NASAL CONGESTION: ICD-10-CM

## 2025-01-07 LAB
SL AMB POCT RAPID FLU A: NEGATIVE
SL AMB POCT RAPID FLU B: NEGATIVE

## 2025-01-07 PROCEDURE — 99213 OFFICE O/P EST LOW 20 MIN: CPT | Performed by: STUDENT IN AN ORGANIZED HEALTH CARE EDUCATION/TRAINING PROGRAM

## 2025-01-07 PROCEDURE — 87804 INFLUENZA ASSAY W/OPTIC: CPT | Performed by: STUDENT IN AN ORGANIZED HEALTH CARE EDUCATION/TRAINING PROGRAM

## 2025-01-07 RX ORDER — BENZONATATE 100 MG/1
100 CAPSULE ORAL 3 TIMES DAILY PRN
Qty: 60 CAPSULE | Refills: 1 | Status: SHIPPED | OUTPATIENT
Start: 2025-01-07

## 2025-01-07 NOTE — PROGRESS NOTES
Name: Antonietta Manzano      : 2000      MRN: [id number]  Encounter Provider: Karina Randolph MD  Encounter Date: 2025   Encounter department: [facility]  :  Assessment & Plan  Upper respiratory tract infection, unspecified type  -First day of symptoms  -Monitor with supportive care-increased fluid hydration and fever control  -Will hold off on antibiotic at this time         Acute cough    Orders:  •  benzonatate (TESSALON PERLES) 100 mg capsule; Take 1 capsule (100 mg total) by mouth 3 (three) times a day as needed for cough    Nasal congestion  -Rapid flu negative, COVID at home negative  Orders:  •  POCT rapid flu A and B          Depression Screening and Follow-up Plan: Patient was screened for depression during today's encounter. They screened negative with a PHQ-2 score of 0.      History of Present Illness     Shortness of Breath  The current episode started yesterday. The problem occurs constantly. The problem has been gradually worsening since onset. Associated symptoms include chest pain, orthopnea and rhinorrhea. Pertinent negatives include no leg swelling, sore throat or wheezing. The symptoms are aggravated by exercise and URIs.     Patient reports chest congestion and burning in chest. Symptoms have going on for one day. She does have a cough.     Review of Systems   Constitutional:  Negative for fever.   HENT:  Positive for ear pain and rhinorrhea. Negative for sore throat.    Respiratory:  Positive for shortness of breath. Negative for wheezing.    Cardiovascular:  Positive for chest pain and orthopnea. Negative for leg swelling.   Gastrointestinal:  Negative for abdominal pain and vomiting.   Musculoskeletal:  Positive for neck pain.   Skin:  Negative for rash.   Neurological:  Positive for headaches.       Objective   BP 92/70 (BP Location: Left arm, Patient Position: Sitting, Cuff Size: Large)   Pulse 58   Temp (!) 96.6 °F (35.9 °C) (Temporal)   Resp 16   Ht  "5' 6\" (1.676 m)   Wt 57.2 kg (126 lb)   LMP 12/17/2024 (Approximate)   SpO2 98%   BMI 20.34 kg/m²      Physical Exam  Constitutional:       Appearance: Normal appearance.   HENT:      Head: Normocephalic and atraumatic.   Cardiovascular:      Rate and Rhythm: Normal rate and regular rhythm.      Pulses: Normal pulses.      Heart sounds: Normal heart sounds.   Pulmonary:      Effort: Pulmonary effort is normal.      Breath sounds: Normal breath sounds.   Neurological:      General: No focal deficit present.      Mental Status: She is alert and oriented to person, place, and time.   Psychiatric:         Mood and Affect: Mood normal.         Behavior: Behavior normal.         Thought Content: Thought content normal.         Judgment: Judgment normal.         "

## 2025-01-24 DIAGNOSIS — L70.0 ACNE VULGARIS: ICD-10-CM

## 2025-01-24 RX ORDER — SPIRONOLACTONE 25 MG/1
25 TABLET ORAL DAILY
Qty: 90 TABLET | Refills: 1 | Status: SHIPPED | OUTPATIENT
Start: 2025-01-24

## 2025-02-17 DIAGNOSIS — L70.0 ACNE VULGARIS: ICD-10-CM

## 2025-02-17 RX ORDER — SPIRONOLACTONE 25 MG/1
50 TABLET ORAL DAILY
Qty: 180 TABLET | Refills: 1 | Status: SHIPPED | OUTPATIENT
Start: 2025-02-17

## 2025-05-21 DIAGNOSIS — Z30.41 ENCOUNTER FOR SURVEILLANCE OF CONTRACEPTIVE PILLS: ICD-10-CM

## 2025-05-21 RX ORDER — LEVONORGESTREL AND ETHINYL ESTRADIOL 0.15-0.03
1 KIT ORAL DAILY
Qty: 84 TABLET | Refills: 3 | Status: SHIPPED | OUTPATIENT
Start: 2025-05-21

## 2025-06-10 DIAGNOSIS — L70.0 ACNE VULGARIS: ICD-10-CM

## 2025-06-10 RX ORDER — SPIRONOLACTONE 25 MG/1
50 TABLET ORAL DAILY
Qty: 180 TABLET | Refills: 1 | Status: SHIPPED | OUTPATIENT
Start: 2025-06-10